# Patient Record
Sex: MALE | Race: WHITE | NOT HISPANIC OR LATINO | Employment: OTHER | URBAN - METROPOLITAN AREA
[De-identification: names, ages, dates, MRNs, and addresses within clinical notes are randomized per-mention and may not be internally consistent; named-entity substitution may affect disease eponyms.]

---

## 2017-01-23 ENCOUNTER — TELEPHONE (OUTPATIENT)
Dept: NEUROLOGY | Facility: CLINIC | Age: 82
End: 2017-01-23

## 2017-01-23 NOTE — TELEPHONE ENCOUNTER
----- Message from Dayana Cox sent at 1/23/2017  9:31 AM CST -----  Contact: June (wife)[phone]  Meghan is calling to schedule her husbands follow up appt, she received a recall letter

## 2017-01-25 ENCOUNTER — TELEPHONE (OUTPATIENT)
Dept: NEUROLOGY | Facility: CLINIC | Age: 82
End: 2017-01-25

## 2017-01-25 NOTE — TELEPHONE ENCOUNTER
----- Message from Dayana Cox sent at 1/25/2017  8:43 AM CST -----  Contact: June [phone]  Meghan is calling to schedule follow up appt for her  to see the doctor in March, lucy call

## 2017-03-01 ENCOUNTER — TELEPHONE (OUTPATIENT)
Dept: NEUROLOGY | Facility: CLINIC | Age: 82
End: 2017-03-01

## 2017-03-01 NOTE — TELEPHONE ENCOUNTER
Left VM for pt's wife, June, on the home line, notifying need of to r/s the March 14th appt with Dr. Gonzalez due to provider being out. Direct contact # provided.

## 2017-03-02 NOTE — TELEPHONE ENCOUNTER
Confirmed new f/u appt for 3/23/17 @ 4:20pm with pt's wife, June. This appt to follow his 3pm appt with Dr. Dia.

## 2017-03-10 DIAGNOSIS — F01.53 VASCULAR DEMENTIA WITH DEPRESSION: ICD-10-CM

## 2017-03-10 DIAGNOSIS — G20.A1 PD (PARKINSON'S DISEASE): ICD-10-CM

## 2017-03-10 DIAGNOSIS — G20.A1 PD (PARKINSON'S DISEASE): Primary | ICD-10-CM

## 2017-03-10 RX ORDER — CARBIDOPA AND LEVODOPA 25; 100 MG/1; MG/1
1.5 TABLET ORAL 4 TIMES DAILY
Qty: 240 TABLET | Refills: 11 | Status: SHIPPED | OUTPATIENT
Start: 2017-03-10 | End: 2017-05-24 | Stop reason: SDUPTHER

## 2017-03-10 RX ORDER — MEMANTINE HYDROCHLORIDE 28 MG/1
1 CAPSULE, EXTENDED RELEASE ORAL DAILY
Qty: 90 EACH | Refills: 0 | Status: SHIPPED | OUTPATIENT
Start: 2017-03-10 | End: 2017-03-23 | Stop reason: SDUPTHER

## 2017-03-10 NOTE — TELEPHONE ENCOUNTER
----- Message from Yandy Nichols sent at 3/10/2017  1:01 PM CST -----  Contact: arcelia (wife) @ [phone]  Pt is req a new prescription for carbidopa-levodopa  mg (SINEMET)  mg per tablet.  [phone].

## 2017-03-23 ENCOUNTER — OFFICE VISIT (OUTPATIENT)
Dept: PSYCHIATRY | Facility: CLINIC | Age: 82
End: 2017-03-23
Payer: MEDICARE

## 2017-03-23 ENCOUNTER — OFFICE VISIT (OUTPATIENT)
Dept: NEUROLOGY | Facility: CLINIC | Age: 82
End: 2017-03-23
Payer: MEDICARE

## 2017-03-23 VITALS — SYSTOLIC BLOOD PRESSURE: 109 MMHG | HEIGHT: 76 IN | DIASTOLIC BLOOD PRESSURE: 56 MMHG | HEART RATE: 61 BPM

## 2017-03-23 VITALS
HEIGHT: 76 IN | SYSTOLIC BLOOD PRESSURE: 112 MMHG | BODY MASS INDEX: 26.55 KG/M2 | HEART RATE: 58 BPM | WEIGHT: 218 LBS | DIASTOLIC BLOOD PRESSURE: 69 MMHG

## 2017-03-23 DIAGNOSIS — G20.A1 PD (PARKINSON'S DISEASE): ICD-10-CM

## 2017-03-23 DIAGNOSIS — H61.22 IMPACTED CERUMEN OF LEFT EAR: Primary | ICD-10-CM

## 2017-03-23 DIAGNOSIS — F01.53 VASCULAR DEMENTIA WITH DEPRESSION: Primary | ICD-10-CM

## 2017-03-23 PROCEDURE — 99213 OFFICE O/P EST LOW 20 MIN: CPT | Mod: PBBFAC | Performed by: PSYCHIATRY & NEUROLOGY

## 2017-03-23 PROCEDURE — 99999 PR PBB SHADOW E&M-EST. PATIENT-LVL III: CPT | Mod: PBBFAC,,, | Performed by: PSYCHIATRY & NEUROLOGY

## 2017-03-23 PROCEDURE — 90847 FAMILY PSYTX W/PT 50 MIN: CPT | Mod: PBBFAC | Performed by: PSYCHIATRY & NEUROLOGY

## 2017-03-23 PROCEDURE — 99213 OFFICE O/P EST LOW 20 MIN: CPT | Mod: PBBFAC,27,25 | Performed by: PSYCHIATRY & NEUROLOGY

## 2017-03-23 PROCEDURE — 90847 FAMILY PSYTX W/PT 50 MIN: CPT | Mod: S$PBB,,, | Performed by: PSYCHIATRY & NEUROLOGY

## 2017-03-23 PROCEDURE — 99215 OFFICE O/P EST HI 40 MIN: CPT | Mod: S$PBB,,, | Performed by: PSYCHIATRY & NEUROLOGY

## 2017-03-23 RX ORDER — ESCITALOPRAM OXALATE 20 MG/1
20 TABLET ORAL DAILY
Qty: 90 TABLET | Refills: 1 | Status: SHIPPED | OUTPATIENT
Start: 2017-03-23 | End: 2017-12-07 | Stop reason: SDUPTHER

## 2017-03-23 RX ORDER — MEMANTINE HYDROCHLORIDE 28 MG/1
1 CAPSULE, EXTENDED RELEASE ORAL DAILY
Qty: 90 EACH | Refills: 1 | Status: SHIPPED | OUTPATIENT
Start: 2017-03-23 | End: 2017-12-07 | Stop reason: SDUPTHER

## 2017-03-23 NOTE — MR AVS SNAPSHOT
[facility]  [address]  [facility] [address]  Phone: [phone]  Fax: [phone]                  Armando Eng JrLatanya   3/23/2017 3:00 PM   Office Visit    Description:  Male : 1931   Provider:  Ritesh Dia Jr., MD   Department:  [facility]           Reason for Visit     Depression     Irritability     Memory Deficits     Psychomotor Retardation           Diagnoses this Visit        Comments    Vascular dementia with depression    -  Primary     PD (Parkinson's disease)                To Do List           Future Appointments        Provider Department Dept Phone    3/23/2017 4:20 PM Elliott Gonzalez MD [facility]rology [phone]      Goals (5 Years of Data)     None      Follow-Up and Disposition     Return in about 3 months (around 2017).       These Medications        Disp Refills Start End    folic acid-vit B6-vit B12 2.5-25-2 mg (FOLBIC) 2.5-25-2 mg Tab 90 tablet 1 3/23/2017     Take 1 tablet by mouth once daily. - Oral    Pharmacy: [facility]/pharmacy #[id number] - [place] [address] Ph #: [phone]       escitalopram oxalate (LEXAPRO) 20 MG tablet 90 tablet 1 3/23/2017     Take 1 tablet (20 mg total) by mouth once daily. - Oral    Pharmacy: [facility]/pharmacy #[id number]  [place] [address] Ph #: [phone]       memantine (NAMENDA XR) 28 mg CSpX 90 each 1 3/23/2017     Take 1 tablet by mouth once daily. - Oral    Pharmacy: [facility]/pharmacy #[id number]  [place] [address] Ph #: [phone]         [facility] On Call     [facility] On Call Nurse Care Line -  Assistance  Registered nurses in the [facility] provide clinical advisement, health education, appointment booking, and other advisory services.  Call for this free service at [phone].             Medications           Message regarding Medications     Verify the changes and/or additions to your medication regime listed below are the same as  discussed with your clinician today.  If any of these changes or additions are incorrect, please notify your healthcare provider.             Verify that the below list of medications is an accurate representation of the medications you are currently taking.  If none reported, the list may be blank. If incorrect, please contact your healthcare provider. Carry this list with you in case of emergency.           Current Medications     acetaminophen (TYLENOL) 325 MG tablet Take 2 tablets (650 mg total) by mouth every 6 (six) hours as needed (Temperature greater than, or equal to 101 degrees).    aluminum-magnesium hydroxide-simethicone (ANTACID ANTI-GAS) 200-200-20 mg/5 mL Susp Take 10 mLs by mouth 4 (four) times daily before meals and nightly.    apixaban (ELIQUIS) 5 mg Tab Take 5 mg by mouth 2 (two) times daily.    arginine-glutamine-calcium HMB (EPIFANIO) 7-7-1.5 gram PwPk Take by mouth once daily.    ascorbic acid (VITAMIN C) 500 MG tablet Take 500 mg by mouth once daily.    ascorbic acid, vitamin C, (VITAMIN C) 500 MG tablet Take 500 mg by mouth once daily.    carbidopa-levodopa  mg (SINEMET)  mg per tablet Take 1.5 tablets by mouth 4 (four) times daily.    carvedilol (COREG) 3.125 MG tablet Take 3.125 mg by mouth 2 (two) times daily.    cholecalciferol, vitamin D3, (VITAMIN D3) 2,000 unit Cap Take 1 capsule by mouth once daily.    docusate sodium (COLACE) 100 MG capsule Take 100 mg by mouth 2 (two) times daily.    escitalopram oxalate (LEXAPRO) 20 MG tablet Take 1 tablet (20 mg total) by mouth once daily.    finasteride (PROSCAR) 5 mg tablet Take 5 mg by mouth once daily.    folic acid-vit B6-vit B12 2.5-25-2 mg (FOLBIC) 2.5-25-2 mg Tab Take 1 tablet by mouth once daily.    food supplement, lactose-free (ENSURE) Liqd Take 1 mL by mouth once daily. 1 can daily    furosemide (LASIX) 20 MG tablet Take 20 mg by mouth 2 (two) times daily.    guaifenesin 100 mg/5 ml (ROBITUSSIN) 100 mg/5 mL syrup Take 200 mg  "by mouth 3 (three) times daily as needed for Cough.    Lactobacillus rhamnosus GG (CULTURELLE) 10 billion cell capsule Take 1 capsule by mouth once daily.    latanoprost 0.005 % ophthalmic solution 1 drop every evening.    lisinopril (PRINIVIL,ZESTRIL) 20 MG tablet Take 1 tablet (20 mg total) by mouth once daily.    memantine (NAMENDA XR) 28 mg CSpX Take 1 tablet by mouth once daily.    nitroGLYCERIN (NITROSTAT) 0.3 MG SL tablet Place 0.3 mg under the tongue every 5 (five) minutes as needed for Chest pain.    rivastigmine (EXELON) 4.6 mg/24 hr PT24 Place 1 patch onto the skin once daily.    spironolactone (ALDACTONE) 25 MG tablet Take 25 mg by mouth once daily.    UBIDECARENONE (COENZYME Q10) 100 mg Tab Take 1 tablet by mouth once daily.           Clinical Reference Information           Your Vitals Were     BP Pulse Height             109/56 61 6' 4" (1.93 m)         Blood Pressure          Most Recent Value    BP  (!)  109/56      Allergies as of 3/23/2017     Indocin [Indomethacin Sodium]      Immunizations Administered on Date of Encounter - 3/23/2017     None      Orders Placed During Today's Visit      Normal Orders This Visit    Psy Authorization: Pharm + Psychotherapy,  6 x/yr       [facility] Sign-Up     Activating your [facility] account is as easy as 1-2-3!     1) Visit [email or web address], select Sign Up Now, enter this activation code and your date of birth, then select Next.  [email or web address]  Expires: 5/7/2017  3:26 PM      2) Create a username and password to use when you visit [facility] in the future and select a security question in case you lose your password and select Next.    3) Enter your e-mail address and click Sign Up!    Additional Information  If you have questions, please e-mail [email or web address] or call [phone] to talk to our [facility] staff. Remember, [facility] is NOT to be used for urgent needs. For medical emergencies, dial 911.         Instructions    Doubt that Exelon Patch is " helping.  Check with Dr. Gonzalez about stopping this.    Also ask about increasing treatment for advancing Parkinson's dz.      Ask Primary Care Physician to examine complaint of ear blockage.    Call if problems arise.  Go to ER if in crisis.         Language Assistance Services     ATTENTION: Language assistance services are available, free of charge. Please call [phone].      ATENCIÓN: Si habla español, tiene a lai disposición servicios gratuitos de asistencia lingüística. Llame al [phone].     CHEMO Ý: N?u b?n nói Ti?ng Vi?t, có các d?ch v? h? tr? ngôn ng? mi?n phí dành cho b?n. G?i s? [phone].         [facility] complies with applicable Federal civil rights laws and does not discriminate on the basis of race, color, national origin, age, disability, or sex.

## 2017-03-23 NOTE — LETTER
March 28, 2017      Mello Jameson MD  [address] [facility] [place] [address]           [facility]  [address]  [place] [address]  Phone: [phone]  Fax: [phone]          Patient: Armando Eng Jr.   MR Number: [id number]   YOB: 1931   Date of Visit: 3/23/2017       Dear Dr. Mello Jameson:    Thank you for referring Armando Eng to me for evaluation. Attached you will find relevant portions of my assessment and plan of care.    If you have questions, please do not hesitate to call me. I look forward to following Armando Eng along with you.    Sincerely,    Elliott Gonzalez MD    Enclosure  CC:  No Recipients    If you would like to receive this communication electronically, please contact [email or web address] or [phone] to request more information on [employer] Link access.    For providers and/or their staff who would like to refer a patient to [facility], please contact us through our one-stop-shop provider referral line, [facility], at [phone].    If you feel you have received this communication in error or would no longer like to receive these types of communications, please e-mail [email or web address]

## 2017-03-23 NOTE — PATIENT INSTRUCTIONS
Doubt that Exelon Patch is helping.  Check with Dr. Gonzalez about stopping this.    Also ask about increasing treatment for advancing Parkinson's dz.      Ask Primary Care Physician to examine complaint of ear blockage.    Call if problems arise.  Go to ER if in crisis.

## 2017-03-23 NOTE — PROGRESS NOTES
Name: Armando Eng Jr.  MRN: [id number]   CSN: [id number]      Date: 03/23/2017    Interval History:  Still living at [facility]  - Accompanied by his son Albert today  - Overall, patient states he has been feeling pretty good  - Patient has seen psychiatrist who recommended stopping exelon patch because it has been having limited if any effect   - Pt sleeping a lot during the day   - Having confusion  - More difficult to get patient transferred in and out of car  - Wheelchair bound most of the time.   - Uses wrong utensils when eating         Current regimen:   Sinemet 25/100mg 1 tabs five times daily, rivastigmine 4.6mg Q 24 hours         History of Present Illness (HPI):   Mr. Eng is an 82 yo  man, retired [employer], with dx of PD, for which he first started seeing a neurology 12 years ago (Dr. Davis at [facility], then since he retired, Dr. Ken for the past 3 years). He has been on PD for a long time (~ 2003), and Dr. Ken had last increased Sinemet to 2.5 tablets QID. He recently had a bout of worsening confusion with subacute onset, associated with combative and violent behavior. Pt's wife decreased it to 2 tablets QID about 1 week ago and pt was still confused. Delirium worsened over a period of a week in March which prompted a visit to the ED. Following this he was admitted to [facility] from 03/26/13 through 04/04/13. Multiple psychotropic medications were discontinued, included Azilect, and Sinemet was resumed. According to review of records it appears Sinemet  mg was most recently reduced from 3 tabs PO TID to 1.5 tabs PO TID. Cognition and behavior have improved since time of hospital discharge but patient was not deemed safe to go home, and has been living in SNF at [facility], where he currently resides. Overall he is clearly eager to return home, and per pt's wife, PT/OT estimates he needs about another month of inpatient therapy before being deemed safe to go home. His functional status  "continues to improve slowly with therapy, and he appears to have returned to his cognitive baseline.   He has no specific complains today other than being eager to leave the nursing facility and go back home.    In September, Had recent admission to  for CHF - took off "liters" of fluid amounting to 20 lbs.  Feels better now.  Doing well with [facility] but stopped due to problem above. No hallucinations or delusions at this time.  Wife very pleased.  Thinking very clearly.  Feels he is following a good healthy regimen at the [facility] (home on the weekends).  has had some dysautonomia     ROS:  Review of Systems   Constitutional: Positive for malaise/fatigue.   Musculoskeletal: Positive for falls (RISK).   Neurological: Negative for tremors.   Psychiatric/Behavioral: Positive for depression and memory loss. Negative for hallucinations (son states he periodically sees him manipulating his hands as if there is something there ).       Past Medical History: The patient  has a past medical history of A-fib (03-27-13); Atrial fibrillation; Dementia; Pacemaker; and PD (Parkinson's disease) (2002).    Social History: The patient  reports that he has never smoked. He has never used smokeless tobacco. He reports that he does not drink alcohol or use illicit drugs.    Family History: Their family history is not on file.    Allergies: Indocin [indomethacin sodium]     Meds:   Current Outpatient Prescriptions on File Prior to Visit   Medication Sig Dispense Refill    apixaban (ELIQUIS) 5 mg Tab Take 5 mg by mouth 2 (two) times daily.      ascorbic acid, vitamin C, (VITAMIN C) 500 MG tablet Take 500 mg by mouth once daily.      carbidopa-levodopa  mg (SINEMET)  mg per tablet Take 1.5 tablets by mouth 4 (four) times daily. 240 tablet 11    cholecalciferol, vitamin D3, (VITAMIN D3) 2,000 unit Cap Take 1 capsule by mouth once daily.      docusate sodium (COLACE) 100 MG capsule Take 100 mg by mouth 2 (two) " times daily.      escitalopram oxalate (LEXAPRO) 20 MG tablet Take 1 tablet (20 mg total) by mouth once daily. 90 tablet 1    finasteride (PROSCAR) 5 mg tablet Take 5 mg by mouth once daily.      food supplement, lactose-free (ENSURE) Liqd Take 1 mL by mouth once daily. 1 can daily      furosemide (LASIX) 20 MG tablet Take 20 mg by mouth 2 (two) times daily.      guaifenesin 100 mg/5 ml (ROBITUSSIN) 100 mg/5 mL syrup Take 200 mg by mouth 3 (three) times daily as needed for Cough.      Lactobacillus rhamnosus GG (CULTURELLE) 10 billion cell capsule Take 1 capsule by mouth once daily.      latanoprost 0.005 % ophthalmic solution 1 drop every evening.      memantine (NAMENDA XR) 28 mg CSpX Take 1 tablet by mouth once daily. 90 each 1    nitroGLYCERIN (NITROSTAT) 0.3 MG SL tablet Place 0.3 mg under the tongue every 5 (five) minutes as needed for Chest pain.      rivastigmine (EXELON) 4.6 mg/24 hr PT24 Place 1 patch onto the skin once daily. 30 patch 11    spironolactone (ALDACTONE) 25 MG tablet Take 25 mg by mouth once daily.      UBIDECARENONE (COENZYME Q10) 100 mg Tab Take 1 tablet by mouth once daily.      acetaminophen (TYLENOL) 325 MG tablet Take 2 tablets (650 mg total) by mouth every 6 (six) hours as needed (Temperature greater than, or equal to 101 degrees).      aluminum-magnesium hydroxide-simethicone (ANTACID ANTI-GAS) 200-200-20 mg/5 mL Susp Take 10 mLs by mouth 4 (four) times daily before meals and nightly.      arginine-glutamine-calcium HMB (EPIFANIO) 7-7-1.5 gram PwPk Take by mouth once daily.      ascorbic acid (VITAMIN C) 500 MG tablet Take 500 mg by mouth once daily.      carvedilol (COREG) 3.125 MG tablet Take 3.125 mg by mouth 2 (two) times daily.      folic acid-vit B6-vit B12 2.5-25-2 mg (FOLBIC) 2.5-25-2 mg Tab Take 1 tablet by mouth once daily. 90 tablet 1    lisinopril (PRINIVIL,ZESTRIL) 20 MG tablet Take 1 tablet (20 mg total) by mouth once daily. 30 tablet 0    [DISCONTINUED]  "escitalopram oxalate (LEXAPRO) 20 MG tablet Take 1 tablet (20 mg total) by mouth once daily. 90 tablet 1    [DISCONTINUED] folic acid-vit B6-vit B12 2.5-25-2 mg (FOLBIC) 2.5-25-2 mg Tab Take 1 tablet by mouth once daily.      [DISCONTINUED] memantine (NAMENDA XR) 28 mg CSpX Take 1 tablet by mouth once daily. 90 each 0     No current facility-administered medications on file prior to visit.        Exam:  /69  Pulse (!) 58  Ht 6' 4" (1.93 m)  Wt 98.9 kg (218 lb)  BMI 26.54 kg/m2    Otoscopic: Impacted cerumen L ear, right tympanic membrane clear     * Specialized movement exam  No temporal tenderness, normal temporal pulses     Eyes open, drowsy.  Slow to communicate.   Hypophonic speech.     Mild facial masking with infrequent blinking.  L>R leonard and CW rigidity.    No RLE tremor with rest, posture, kinesis, or intention.   Has intermittent myoclonus.   + grasp     Gait not evaluated     Non-contrast brain CT - 3/27/2013   Result Narrative       Comparison is made to the cranial CT exam of 3/11/09.    Ventricular system is unchanged in size from the prior exam, and the ventricles are normal in position without midline shift. Well defined, prominent sulci are again noted over both convexities. Parenchymal brain attenuation demonstrates no detrimental  change since the prior exam, with note again made of hemispheric white matter hypoattenuation bilaterally consistent with chronic ischemic microvascular disease. No evidence of acute intracranial hemorrhage. No findings on the noncontrast exam to   suggest neoplasm. No subdural collections. No new areas of parenchymal hypoattenuation or other abnormality indicating recent major vascular distribution infarction. Mucosal thickening in the posterior ethmoid air cells on the right side is   incidentally observed, and incidental note is again made of a small soft tissue opacity within the anterior aspect of the nasal fossa on the right side, an abnormality which " appears essentially stable since the  exam and may represent a nasal polyp.           Result Impression       Noncontrast cranial CT examination again demonstrates findings consistent with chronic ischemic microvascular changes in the hemispheric white matter bilaterally. However, there has been no significant detrimental interval change since 3/11/09, with no  evidence of acute hemorrhage or recent major vascular distribution infarction noted.       - Independent review of images:   No acute intracranial process with diffuse atrophy consistent with age and moderate to severe white matter disease bilaterally suggestive of chronic microvascular ischemia.     Diagnoses:   1) Parkinsonism with dementia, likely mixed vascular and Lewy body progression at this point.  2) Excessive daytime sleepiness.  3) Dysautonomia.    Medical Decision Makin) CD/LD 25/100mg 1 tablet five times a day  2) Continue excelon patch for now   3) Home therapies at [facility] as able  4) ENT referral for L ear cerumen disimpaction     RTC 4-6 weeks will discontinue excelon patch at that time if motor symptoms improved    Complicated comorbidities.     Pt seen and examined with Dr. Carlos Heaton MD  Movement Disorders Fellow  [facility]     ===============  Patient seen and examined.  I agree with the history, exam, assessment and plan within the fellow's note as stated above.  Note has been edited by me to reflect my work and changes.    Elliott Gonzalez MD, MPH  Division of Movement and Memory Disorders  [facility]

## 2017-03-31 NOTE — PROGRESS NOTES
Outpatient Psychiatry Follow-Up Visit (MD/NP)    3/23/2017    Clinical Status of Patient:  Outpatient (Ambulatory)    Chief Complaint:  Armando Eng Jr. is a 85 y.o. male who presents today for follow-up of Depression; Irritability; Memory Deficits; and Psychomotor Retardation    Met with patient and spouse.      Interval History and Content of Current Session:  Interim Events/Subjective Report/Content of Current Session:  Pt returned with his son.  He shows significant cognitive and behavioral decline since his last visit.  This was verified by his son, who indicated that Pt is less active, less interactive, and now rarely returns to his home.  On the other hand, no further falls or behavioral problems were reported.  Pt himself denies any dissatisfaction with his present arrangements.      Medications were reviewed.  An Exelon patch prescribed by Dr. Gonzalez does not appear to be providing any additional benefit.  Pt's son was asked to discuss stopping with when they see Dr. Gonzalez.  At the same time, they were encouraged to ask about possibly increasing medications for Parkinson's disease.  Finally, Pt c/o feeling that his ears are stopped up.  They were instructed to ask his nursing home physician about this on monthly rounds.    Psychotherapy:  · Target symptoms: Memory loss, confusion, agitation.  · Why chosen therapy is appropriate versus another modality: relevant to diagnosis  · Outcome monitoring methods: self-report, observation, feedback from family  · Therapeutic intervention type: behavior modifying psychotherapy, supportive psychotherapy, Family therapy  · Topics discussed/themes: Caregiver issues, relationships difficulties, illness/death of a loved one, stress related to medical comorbidities, symptom recognition, legal stressors, substance abuse  · The patient's response to the intervention is accepting. The patient's progress toward treatment goals is fair.   · Duration of intervention: 30  min    Review of Systems   · PSYCHIATRIC: Pertinant items are noted in the narrative.  · CONSTITUTIONAL: Slow weight loss   · MUSCULOSKELETAL: Generalized weakness.  · NEUROLOGIC: Parkinson disease.  · CARDIOVASCULAR: HTN, Pacemaker.    Past Medical, Family and Social History: The patient's past medical, family and social history have been reviewed and updated as appropriate within the electronic medical record - see encounter notes.    Compliance: yes    Side effects: None    Risk Parameters:  Patient reports no suicidal ideation  Patient reports no homicidal ideation  Patient reports no self-injurious behavior  Patient reports no violent behavior    Exam (detailed: at least 9 elements; comprehensive: all 15 elements)   Constitutional  Vitals:  Most recent vital signs, dated less than 90 days prior to this appointment, were reviewed.    Vitals:    03/23/17 1447   BP: (!) 109/56   Pulse: 61        General:  age appropriate, casually dressed, seated in wheelchair     Musculoskeletal  Muscle Strength/Tone:  no rigidity, no dyskinesia, no tremor, atrophy noted generalized weakness.   Gait & Station:  in wheelchair     Psychiatric  Speech:  soft, non-spontaneous   Mood & Affect:  neutral  shallow   Thought Process:  concrete, poverty of thought   Associations:  intact   Thought Content:  normal, no suicidality, no homicidality, delusions, or paranoia   Insight:  poor awareness of illness   Judgement: impaired due to dementia   Orientation:  person, situation, month of year   Memory: poor x 3.   Language: grossly intact   Attention Span & Concentration:  distracted   Fund of Knowledge:  impaired     Assessment and Diagnosis   Status/Progress: Based on the examination today, the patient's problem(s) is/are worsening.  New problems have not been presented today.   Comorbidities are complicating management of the primary condition.  There are no active rule-out diagnoses for this patient at this time.    General Impression:   Pt shows gradual cognitive and behavioral decline between visits.    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  Axis II: NO DIAGNOSIS ON AXIS II (V71.09)  Axis III: Parkinson dz.  Atrial Fibrillation.  Rash.  Axis IV: other psychosocial or environmental problems  Axis V: 61-70 Some mild symptoms (e.g. depressed mood and mild insomnia) OR some difficulty in social, occupational, or school functioning (e.g. occasional truancy, or theft within the household), but generally functioning pretty well, has some meaningful interpersonal relationships    Intervention/Counseling/Treatment Plan   · Medication Management: Continue current medications.  · Pt should remain at [facility].      Return to Clinic: 3 months.  Call prn problems.

## 2017-04-24 ENCOUNTER — TELEPHONE (OUTPATIENT)
Dept: NEUROLOGY | Facility: CLINIC | Age: 82
End: 2017-04-24

## 2017-04-24 NOTE — TELEPHONE ENCOUNTER
----- Message from Eber Cano sent at 4/24/2017  8:07 AM CDT -----  Contact: Spouse  Would like to know why patient is scheduled with another physician on 5/24/17? She states he has been seeing Dr. Gonzalez, and would like to know why the change.    Call: [phone]

## 2017-04-25 NOTE — TELEPHONE ENCOUNTER
----- Message from Yandy Nichols sent at 4/25/2017  8:48 AM CDT -----  Contact: arcelia (wife) @ [phone]  pts wife says she is returning your call.

## 2017-04-25 NOTE — TELEPHONE ENCOUNTER
"Returned wife, Meghan's call. I informed Meghan that patient was seen by Dr. Heaton at the last visit along with Dr. Gonzlaez and the follow up visit scheduled with [facility] should've been discussed during said visit. Further explained that [facility] works together with Dr. Gonzalez, Dr. Ken and Gladys Avila as a team; explained that if [facility] has patients Dr. Gonzalez or Dr. Ken always staff with her as well.  Reassured patient that Dr. Gonzalez isn't "putting patient off' onto another provider. Meghan expressed she was not at the last [facility] visit and going forward would like to only be seen by [facility]. I  Informed patient that appointment with Dr. Heaton will remain, and to address any concerns at their next visit.  I apologized for any miscommunication and informed patient if any other questions or concerns arise to please call our staff so we may help in best possible way.  Meghan expressed verbal understanding.  "

## 2017-05-24 ENCOUNTER — OFFICE VISIT (OUTPATIENT)
Dept: NEUROLOGY | Facility: CLINIC | Age: 82
End: 2017-05-24
Payer: MEDICARE

## 2017-05-24 DIAGNOSIS — G31.83 LEWY BODY DEMENTIA WITHOUT BEHAVIORAL DISTURBANCE: ICD-10-CM

## 2017-05-24 DIAGNOSIS — G20.A1 PD (PARKINSON'S DISEASE): ICD-10-CM

## 2017-05-24 DIAGNOSIS — F02.80 LEWY BODY DEMENTIA WITHOUT BEHAVIORAL DISTURBANCE: ICD-10-CM

## 2017-05-24 DIAGNOSIS — R05.9 COUGH: ICD-10-CM

## 2017-05-24 PROCEDURE — 99212 OFFICE O/P EST SF 10 MIN: CPT | Mod: PBBFAC | Performed by: PSYCHIATRY & NEUROLOGY

## 2017-05-24 PROCEDURE — 99212 OFFICE O/P EST SF 10 MIN: CPT | Mod: S$PBB,,, | Performed by: PSYCHIATRY & NEUROLOGY

## 2017-05-24 PROCEDURE — 99999 PR PBB SHADOW E&M-EST. PATIENT-LVL II: CPT | Mod: PBBFAC,,, | Performed by: PSYCHIATRY & NEUROLOGY

## 2017-05-24 RX ORDER — CARBIDOPA AND LEVODOPA 25; 100 MG/1; MG/1
TABLET ORAL
Qty: 300 TABLET | Refills: 11 | Status: SHIPPED | OUTPATIENT
Start: 2017-05-24 | End: 2017-05-24 | Stop reason: SDUPTHER

## 2017-05-24 RX ORDER — CARBIDOPA AND LEVODOPA 25; 100 MG/1; MG/1
TABLET ORAL
Qty: 300 TABLET | Refills: 11 | Status: SHIPPED | OUTPATIENT
Start: 2017-05-24

## 2017-05-24 RX ORDER — CARBIDOPA AND LEVODOPA 25; 100 MG/1; MG/1
TABLET ORAL
Qty: 300 TABLET | Refills: 11 | OUTPATIENT
Start: 2017-05-24 | End: 2017-05-24 | Stop reason: SDUPTHER

## 2017-05-24 NOTE — ASSESSMENT & PLAN NOTE
Lung sounds clear on exam today     - Consider repeat CXR at nursing facility if cough does not resolve within one week

## 2017-05-24 NOTE — PROGRESS NOTES
Name: Armando Eng Jr.  MRN: [id number]   CSN: [id number]      Date: 05/24/2017    Interval History:  Still living at [facility]  - Family still doesn't think that excelon patch is helping with anything  - CD/LD increase hasn't changed his ability to ambulate   - Still very confused  - Eating is dong well, had one chocking incident   - Still sleeping quite a bit but level of consiousness not fluctuating as much   - still with ear wax   - coughing more   - manipulates things with his hands that are not there still     Interval History 3/23/17  - Accompanied by his son Albert today  - Overall, patient states he has been feeling pretty good  - Patient has seen psychiatrist who recommended stopping exelon patch because it has been having limited if any effect   - Pt sleeping a lot during the day   - Having confusion  - More difficult to get patient transferred in and out of car  - Wheelchair bound most of the time.   - Uses wrong utensils when eating     Current regimen:   Sinemet 25/100mg 1 tabs five times daily, rivastigmine 4.6mg Q 24 hours         History of Present Illness (HPI):   Mr. Eng is an 80 yo  man, retired [employer], with dx of PD, for which he first started seeing a neurology 12 years ago (Dr. Davis at [facility], then since he retired, Dr. Ken for the past 3 years). He has been on PD for a long time (~ 2003), and Dr. Ken had last increased Sinemet to 2.5 tablets QID. He recently had a bout of worsening confusion with subacute onset, associated with combative and violent behavior. Pt's wife decreased it to 2 tablets QID about 1 week ago and pt was still confused. Delirium worsened over a period of a week in March which prompted a visit to the ED. Following this he was admitted to [facility] from 03/26/13 through 04/04/13. Multiple psychotropic medications were discontinued, included Azilect, and Sinemet was resumed. According to review of records it appears Sinemet  mg was most recently reduced from  "3 tabs PO TID to 1.5 tabs PO TID. Cognition and behavior have improved since time of hospital discharge but patient was not deemed safe to go home, and has been living in SNF at [facility], where he currently resides. Overall he is clearly eager to return home, and per pt's wife, PT/OT estimates he needs about another month of inpatient therapy before being deemed safe to go home. His functional status continues to improve slowly with therapy, and he appears to have returned to his cognitive baseline.   He has no specific complains today other than being eager to leave the nursing facility and go back home.    In September, Had recent admission to  for CHF - took off "liters" of fluid amounting to 20 lbs.  Feels better now.  Doing well with [facility] but stopped due to problem above. No hallucinations or delusions at this time.  Wife very pleased.  Thinking very clearly.  Feels he is following a good healthy regimen at the [facility] (home on the weekends).  has had some dysautonomia     ROS:  Review of Systems   Constitutional: Positive for malaise/fatigue.   Musculoskeletal: Positive for falls (RISK).   Neurological: Negative for tremors.   Psychiatric/Behavioral: Positive for depression and memory loss. Negative for hallucinations (son states he periodically sees him manipulating his hands as if there is something there ).       Past Medical History: The patient  has a past medical history of A-fib (03-27-13); Atrial fibrillation; Dementia; Pacemaker; and PD (Parkinson's disease) (2002).    Social History: The patient  reports that he has never smoked. He has never used smokeless tobacco. He reports that he does not drink alcohol or use drugs.    Family History: Their family history is not on file.    Allergies: Indocin [indomethacin sodium]     Meds:   Current Outpatient Prescriptions on File Prior to Visit   Medication Sig Dispense Refill    apixaban (ELIQUIS) 5 mg Tab Take 5 mg by mouth 2 (two) times " daily.      ascorbic acid (VITAMIN C) 500 MG tablet Take 500 mg by mouth once daily.      carbidopa-levodopa  mg (SINEMET)  mg per tablet Take 1.5 tablets by mouth 4 (four) times daily. 240 tablet 11    carvedilol (COREG) 3.125 MG tablet Take 3.125 mg by mouth 2 (two) times daily.      cholecalciferol, vitamin D3, (VITAMIN D3) 2,000 unit Cap Take 1 capsule by mouth once daily.      docusate sodium (COLACE) 100 MG capsule Take 100 mg by mouth 2 (two) times daily.      escitalopram oxalate (LEXAPRO) 20 MG tablet Take 1 tablet (20 mg total) by mouth once daily. 90 tablet 1    finasteride (PROSCAR) 5 mg tablet Take 5 mg by mouth once daily.      folic acid-vit B6-vit B12 2.5-25-2 mg (FOLBIC) 2.5-25-2 mg Tab Take 1 tablet by mouth once daily. 90 tablet 1    furosemide (LASIX) 20 MG tablet Take 20 mg by mouth 2 (two) times daily.      guaifenesin 100 mg/5 ml (ROBITUSSIN) 100 mg/5 mL syrup Take 200 mg by mouth 3 (three) times daily as needed for Cough.      Lactobacillus rhamnosus GG (CULTURELLE) 10 billion cell capsule Take 1 capsule by mouth once daily.      latanoprost 0.005 % ophthalmic solution 1 drop every evening.      memantine (NAMENDA XR) 28 mg CSpX Take 1 tablet by mouth once daily. 90 each 1    rivastigmine (EXELON) 4.6 mg/24 hr PT24 Place 1 patch onto the skin once daily. 30 patch 11    spironolactone (ALDACTONE) 25 MG tablet Take 25 mg by mouth once daily.      UBIDECARENONE (COENZYME Q10) 100 mg Tab Take 1 tablet by mouth once daily.      acetaminophen (TYLENOL) 325 MG tablet Take 2 tablets (650 mg total) by mouth every 6 (six) hours as needed (Temperature greater than, or equal to 101 degrees).      aluminum-magnesium hydroxide-simethicone (ANTACID ANTI-GAS) 200-200-20 mg/5 mL Susp Take 10 mLs by mouth 4 (four) times daily before meals and nightly.      arginine-glutamine-calcium HMB (EPIFANIO) 7-7-1.5 gram PwPk Take by mouth once daily.      ascorbic acid, vitamin C, (VITAMIN  C) 500 MG tablet Take 500 mg by mouth once daily.      food supplement, lactose-free (ENSURE) Liqd Take 1 mL by mouth once daily. 1 can daily      lisinopril (PRINIVIL,ZESTRIL) 20 MG tablet Take 1 tablet (20 mg total) by mouth once daily. 30 tablet 0    nitroGLYCERIN (NITROSTAT) 0.3 MG SL tablet Place 0.3 mg under the tongue every 5 (five) minutes as needed for Chest pain.       No current facility-administered medications on file prior to visit.        Exam:  There were no vitals taken for this visit.    Otoscopic: Impacted cerumen L ear, right tympanic membrane clear     * Specialized movement exam    Eyes open, drowsy.  Oriented to person, not month or president, Slow to communicate.   Spells world correctly forwards but backwards: LD, later DLR  Hypophonic speech.     Mild facial masking with infrequent blinking.  L>R leonard and CW rigidity.    No RLE tremor with rest, posture, kinesis, or intention.   Has intermittent myoclonus.   + grasp     Gait not evaluated     Non-contrast brain CT - 3/27/2013   Result Narrative       Comparison is made to the cranial CT exam of 3/11/09.    Ventricular system is unchanged in size from the prior exam, and the ventricles are normal in position without midline shift. Well defined, prominent sulci are again noted over both convexities. Parenchymal brain attenuation demonstrates no detrimental  change since the prior exam, with note again made of hemispheric white matter hypoattenuation bilaterally consistent with chronic ischemic microvascular disease. No evidence of acute intracranial hemorrhage. No findings on the noncontrast exam to   suggest neoplasm. No subdural collections. No new areas of parenchymal hypoattenuation or other abnormality indicating recent major vascular distribution infarction. Mucosal thickening in the posterior ethmoid air cells on the right side is   incidentally observed, and incidental note is again made of a small soft tissue opacity within the  anterior aspect of the nasal fossa on the right side, an abnormality which appears essentially stable since the 2009 exam and may represent a nasal polyp.           Result Impression       Noncontrast cranial CT examination again demonstrates findings consistent with chronic ischemic microvascular changes in the hemispheric white matter bilaterally. However, there has been no significant detrimental interval change since 3/11/09, with no  evidence of acute hemorrhage or recent major vascular distribution infarction noted.       - Independent review of images:   No acute intracranial process with diffuse atrophy consistent with age and moderate to severe white matter disease bilaterally suggestive of chronic microvascular ischemia.       Medical Decision Making:     Problem List        Neuro    Lewy body dementia without behavioral disturbance    Overview     Severe parkinsonism with bradykinesia, hypophonic speech, decreased ambulation          Current Assessment & Plan     - Continue Carbidopa/Levodopa 1 tablets five times daily  - Discontinue Excelon patch but resume in a few days if any symptoms worse   - RTC 3 months with Dr. Gonzalez          Relevant Medications    carbidopa-levodopa  mg (SINEMET)  mg per tablet       Pulmonary    Cough    Overview     Productive         Current Assessment & Plan     Lung sounds clear on exam today     - Consider repeat CXR at nursing facility if cough does not resolve within one week                  Hallucinations are mild and not interfering with life right now, patient and family opting to monitor for now. No new medications.     Min Heaton MD  Associate Staff, Movement Disorders Fellow  [facility]

## 2017-05-24 NOTE — ASSESSMENT & PLAN NOTE
- Continue Carbidopa/Levodopa 1 tablets five times daily  - Discontinue Excelon patch but resume in a few days if any symptoms worse   - RTC 3 months with Dr. Gonzalez

## 2017-08-04 ENCOUNTER — TELEPHONE (OUTPATIENT)
Dept: NEUROLOGY | Facility: CLINIC | Age: 82
End: 2017-08-04

## 2017-08-04 NOTE — TELEPHONE ENCOUNTER
----- Message from Yandy Nichols sent at 8/4/2017  8:27 AM CDT -----  Contact: arcelia (wife) @ [phone]  pts wife says they missed a call yesterday from [facility].  She is wondering if someone from Dr Gonzalez's office tried to call them.  If no answer pls leave a message.

## 2017-08-04 NOTE — TELEPHONE ENCOUNTER
SPoke with pt's wife, June, and the pt has been r/s for 9/6/17 @ 4:00pm with Dr. Gonzalez. She will contact [facility] and arrange for van transport for him to come to [facility].

## 2017-09-06 ENCOUNTER — OFFICE VISIT (OUTPATIENT)
Dept: NEUROLOGY | Facility: CLINIC | Age: 82
End: 2017-09-06
Payer: MEDICARE

## 2017-09-06 VITALS
DIASTOLIC BLOOD PRESSURE: 77 MMHG | HEART RATE: 60 BPM | WEIGHT: 210 LBS | SYSTOLIC BLOOD PRESSURE: 122 MMHG | BODY MASS INDEX: 25.57 KG/M2 | HEIGHT: 76 IN

## 2017-09-06 DIAGNOSIS — G31.83 LEWY BODY DEMENTIA WITH BEHAVIORAL DISTURBANCE: Primary | ICD-10-CM

## 2017-09-06 DIAGNOSIS — F02.818 LEWY BODY DEMENTIA WITH BEHAVIORAL DISTURBANCE: Primary | ICD-10-CM

## 2017-09-06 PROCEDURE — 99999 PR PBB SHADOW E&M-EST. PATIENT-LVL III: CPT | Mod: PBBFAC,,, | Performed by: PSYCHIATRY & NEUROLOGY

## 2017-09-06 PROCEDURE — 99213 OFFICE O/P EST LOW 20 MIN: CPT | Mod: S$PBB,,, | Performed by: PSYCHIATRY & NEUROLOGY

## 2017-09-06 PROCEDURE — 1159F MED LIST DOCD IN RCRD: CPT | Mod: ,,, | Performed by: PSYCHIATRY & NEUROLOGY

## 2017-09-06 PROCEDURE — 99213 OFFICE O/P EST LOW 20 MIN: CPT | Mod: PBBFAC | Performed by: PSYCHIATRY & NEUROLOGY

## 2017-09-06 PROCEDURE — 3074F SYST BP LT 130 MM HG: CPT | Mod: ,,, | Performed by: PSYCHIATRY & NEUROLOGY

## 2017-09-06 PROCEDURE — 3078F DIAST BP <80 MM HG: CPT | Mod: ,,, | Performed by: PSYCHIATRY & NEUROLOGY

## 2017-09-06 PROCEDURE — 1126F AMNT PAIN NOTED NONE PRSNT: CPT | Mod: ,,, | Performed by: PSYCHIATRY & NEUROLOGY

## 2017-09-06 NOTE — PROGRESS NOTES
Name: Armando Eng Jr.  MRN: [id number]   CSN: [id number]      Date: 09/06/2017    Interval History:  Still living at [facility],   - here with wife  - more issues walking  - fall risk  - seems slower to respond  - less interactive in conversation    From last visit:  - Accompanied by his son Albert today  - Overall, patient states he has been feeling pretty good  - Patient has seen psychiatrist who recommended stopping exelon patch because it has been having limited if any effect   - Pt sleeping a lot during the day   - Having confusion  - More difficult to get patient transferred in and out of car  - Wheelchair bound most of the time.   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 Davis at [facility]", then since he retired, Dr. Ken for the past 3 years). He has been on PD for a long time (~ 2003), and Dr. Ken had last increased Sinemet to 2.5 tablets QID. He recently had a bout of worsening confusion with subacute onset, associated with combative and violent behavior. Pt's wife decreased it to 2 tablets QID about 1 week ago and pt was still confused. Delirium worsened over a period of a week in March which prompted a visit to the ED. Following this he was admitted to [facility] from 03/26/13 through 04/04/13. Multiple psychotropic medications were discontinued, included Azilect, and Sinemet was resumed. According to review of records it appears Sinemet  mg was most recently reduced from 3 tabs PO TID to 1.5 tabs PO TID. Cognition and behavior have improved since time of hospital discharge but patient was not deemed safe to go home, and has been living in SNF at [facility], where he currently resides. Overall he is clearly eager to return home, and per pt's  "wife, PT/OT estimates he needs about another month of inpatient therapy before being deemed safe to go home. His functional status continues to improve slowly with therapy, and he appears to have returned to his cognitive baseline.   He has no specific complains today other than being eager to leave the nursing facility and go back home.    In September, Had recent admission to  for CHF - took off "liters" of fluid amounting to 20 lbs.  Feels better now.  Doing well with [facility] but stopped due to problem above. No hallucinations or delusions at this time.  Wife very pleased.  Thinking very clearly.  Feels he is following a good healthy regimen at the [facility] (home on the weekends).  has had some dysautonomia     ROS:  Review of Systems   Constitutional: Positive for malaise/fatigue.   Musculoskeletal: Positive for falls (RISK).   Neurological: Negative for tremors.   Psychiatric/Behavioral: Positive for depression and memory loss. Negative for hallucinations (son states he periodically sees him manipulating his hands as if there is something there ).       Past Medical History: The patient  has a past medical history of A-fib (03-27-13); Atrial fibrillation; Dementia; Pacemaker; and PD (Parkinson's disease) (2002).    Social History: The patient  reports that he has never smoked. He has never used smokeless tobacco. He reports that he does not drink alcohol or use drugs.    Family History: Their family history is not on file.    Allergies: Indocin [indomethacin sodium]     Meds:   Current Outpatient Prescriptions on File Prior to Visit   Medication Sig Dispense Refill    apixaban (ELIQUIS) 5 mg Tab Take 5 mg by mouth 2 (two) times daily.      cholecalciferol, vitamin D3, (VITAMIN D3) 2,000 unit Cap Take 1 capsule by mouth once daily.      docusate sodium (COLACE) 100 MG capsule Take 100 mg by mouth 2 (two) times daily.      escitalopram oxalate (LEXAPRO) 20 MG tablet Take 1 tablet (20 mg total) by " mouth once daily. 90 tablet 1    finasteride (PROSCAR) 5 mg tablet Take 5 mg by mouth once daily.      folic acid-vit B6-vit B12 2.5-25-2 mg (FOLBIC) 2.5-25-2 mg Tab Take 1 tablet by mouth once daily. 90 tablet 1    furosemide (LASIX) 20 MG tablet Take 20 mg by mouth 2 (two) times daily.      latanoprost 0.005 % ophthalmic solution 1 drop every evening.      spironolactone (ALDACTONE) 25 MG tablet Take 25 mg by mouth once daily.      UBIDECARENONE (COENZYME Q10) 100 mg Tab Take 1 tablet by mouth once daily.      acetaminophen (TYLENOL) 325 MG tablet Take 2 tablets (650 mg total) by mouth every 6 (six) hours as needed (Temperature greater than, or equal to 101 degrees).      aluminum-magnesium hydroxide-simethicone (ANTACID ANTI-GAS) 200-200-20 mg/5 mL Susp Take 10 mLs by mouth 4 (four) times daily before meals and nightly.      arginine-glutamine-calcium HMB (EPIFANIO) 7-7-1.5 gram PwPk Take by mouth once daily.      ascorbic acid (VITAMIN C) 500 MG tablet Take 500 mg by mouth once daily.      ascorbic acid, vitamin C, (VITAMIN C) 500 MG tablet Take 500 mg by mouth once daily.      carbidopa-levodopa  mg (SINEMET)  mg per tablet Take 1 tablet five times daily 300 tablet 11    carvedilol (COREG) 3.125 MG tablet Take 3.125 mg by mouth 2 (two) times daily.      food supplement, lactose-free (ENSURE) Liqd Take 1 mL by mouth once daily. 1 can daily      guaifenesin 100 mg/5 ml (ROBITUSSIN) 100 mg/5 mL syrup Take 200 mg by mouth 3 (three) times daily as needed for Cough.      Lactobacillus rhamnosus GG (CULTURELLE) 10 billion cell capsule Take 1 capsule by mouth once daily.      lisinopril (PRINIVIL,ZESTRIL) 20 MG tablet Take 1 tablet (20 mg total) by mouth once daily. 30 tablet 0    memantine (NAMENDA XR) 28 mg CSpX Take 1 tablet by mouth once daily. 90 each 1    nitroGLYCERIN (NITROSTAT) 0.3 MG SL tablet Place 0.3 mg under the tongue every 5 (five) minutes as needed for Chest pain.       No  "current facility-administered medications on file prior to visit.        Exam:  /77   Pulse 60   Ht 6' 4" (1.93 m)   Wt 95.3 kg (210 lb)   BMI 25.56 kg/m²     Otoscopic: Impacted cerumen L ear, right tympanic membrane clear     * Specialized movement exam  No temporal tenderness, normal temporal pulses     Eyes open, drowsy.  Slow to communicate.   Hypophonic speech.     Mild facial masking with infrequent blinking.  L>R leonard and CW rigidity.    No RLE tremor with rest, posture, kinesis, or intention.   Has intermittent myoclonus.   + grasp     Gait not evaluated     Non-contrast brain CT - 3/27/2013   Result Narrative       Comparison is made to the cranial CT exam of 3/11/09.    Ventricular system is unchanged in size from the prior exam, and the ventricles are normal in position without midline shift. Well defined, prominent sulci are again noted over both convexities. Parenchymal brain attenuation demonstrates no detrimental  change since the prior exam, with note again made of hemispheric white matter hypoattenuation bilaterally consistent with chronic ischemic microvascular disease. No evidence of acute intracranial hemorrhage. No findings on the noncontrast exam to   suggest neoplasm. No subdural collections. No new areas of parenchymal hypoattenuation or other abnormality indicating recent major vascular distribution infarction. Mucosal thickening in the posterior ethmoid air cells on the right side is   incidentally observed, and incidental note is again made of a small soft tissue opacity within the anterior aspect of the nasal fossa on the right side, an abnormality which appears essentially stable since the 2009 exam and may represent a nasal polyp.           Result Impression       Noncontrast cranial CT examination again demonstrates findings consistent with chronic ischemic microvascular changes in the hemispheric white matter bilaterally. However, there has been no significant detrimental " interval change since 3/11/09, with no  evidence of acute hemorrhage or recent major vascular distribution infarction noted.       - Independent review of images:   No acute intracranial process with diffuse atrophy consistent with age and moderate to severe white matter disease bilaterally suggestive of chronic microvascular ischemia.     Diagnoses:   1) Parkinsonism with dementia, likely mixed vascular and Lewy body progression at this point.  2) Excessive daytime sleepiness.  3) Dysautonomia.    Medical Decision Making:   - no new changes  - keep meds stable  - living arrangement stable  - exercise      Elliott Gonzalez MD, MPH  Division of Movement and Memory Disorders  [facility]

## 2017-09-06 NOTE — LETTER
September 11, 2017      Mello Jameson MD  [address] [place] [address]           [facility]  [address]  [place] [address]  Phone: [phone]  Fax: [phone]          Patient: Armando Eng Jr.   MR Number: [id number]   YOB: 1931   Date of Visit: 9/6/2017       Dear Dr. Mello Jameson:    Thank you for referring Armando Eng to me for evaluation. Attached you will find relevant portions of my assessment and plan of care.    If you have questions, please do not hesitate to call me. I look forward to following Armando Eng along with you.    Sincerely,    Albert Monroe  CC:  No Recipients    If you would like to receive this communication electronically, please contact [email or web address] or [phone] to request more information on [employer] access.    For providers and/or their staff who would like to refer a patient to [facility], please contact us through our one-stop-shop provider referral line, [facility] , at [phone].    If you feel you have received this communication in error or would no longer like to receive these types of communications, please e-mail [email or web address]

## 2017-12-05 ENCOUNTER — TELEPHONE (OUTPATIENT)
Dept: PSYCHIATRY | Facility: CLINIC | Age: 82
End: 2017-12-05

## 2017-12-05 NOTE — TELEPHONE ENCOUNTER
Spoke with Dr. Hammond.  Pt has been agitated and oppositional lately, refusing meds today.  A previous conversation with his wife revealed that she has been at home with the flu for a week and has not been visiting as she has done every day he has been in [facility].    Will try to move up appt.  It was also suggested that he be connected by phone to speak with his wife twice a day until she is well enough to visit again.

## 2017-12-05 NOTE — TELEPHONE ENCOUNTER
----- Message from Sruthi Torres MA sent at 12/5/2017 12:01 PM CST -----  Contact: pt  Pt wife would like you to call [phone]

## 2017-12-05 NOTE — TELEPHONE ENCOUNTER
----- Message from Neela Zepeda MA sent at 12/5/2017  1:56 PM CST -----  Contact: Dr. Jose Suarez [phone]  Dr. Suarez would like to speak with you regarding this pt .

## 2017-12-07 ENCOUNTER — OFFICE VISIT (OUTPATIENT)
Dept: PSYCHIATRY | Facility: CLINIC | Age: 82
End: 2017-12-07
Payer: MEDICARE

## 2017-12-07 VITALS — DIASTOLIC BLOOD PRESSURE: 58 MMHG | HEART RATE: 60 BPM | HEIGHT: 76 IN | SYSTOLIC BLOOD PRESSURE: 120 MMHG

## 2017-12-07 DIAGNOSIS — G20.A1 PD (PARKINSON'S DISEASE): ICD-10-CM

## 2017-12-07 DIAGNOSIS — F01.53 VASCULAR DEMENTIA WITH DEPRESSION: Primary | ICD-10-CM

## 2017-12-07 PROCEDURE — 90847 FAMILY PSYTX W/PT 50 MIN: CPT | Mod: PBBFAC | Performed by: PSYCHIATRY & NEUROLOGY

## 2017-12-07 PROCEDURE — 99999 PR PBB SHADOW E&M-EST. PATIENT-LVL III: CPT | Mod: PBBFAC,,, | Performed by: PSYCHIATRY & NEUROLOGY

## 2017-12-07 PROCEDURE — 99213 OFFICE O/P EST LOW 20 MIN: CPT | Mod: PBBFAC,25 | Performed by: PSYCHIATRY & NEUROLOGY

## 2017-12-07 PROCEDURE — 90847 FAMILY PSYTX W/PT 50 MIN: CPT | Mod: S$PBB,,, | Performed by: PSYCHIATRY & NEUROLOGY

## 2017-12-07 RX ORDER — ESCITALOPRAM OXALATE 20 MG/1
20 TABLET ORAL NIGHTLY
Qty: 90 TABLET | Refills: 1 | Status: SHIPPED | OUTPATIENT
Start: 2017-12-07

## 2017-12-07 RX ORDER — MEMANTINE HYDROCHLORIDE 28 MG/1
28 CAPSULE, EXTENDED RELEASE ORAL DAILY
Qty: 90 EACH | Refills: 1 | Status: SHIPPED | OUTPATIENT
Start: 2017-12-07

## 2017-12-07 NOTE — PATIENT INSTRUCTIONS
Move dose of Lexapro 20 mg from a.m. To bedtime.    Discontinue Exelon patch.    Arrange communication by phone twice a day between Pt and his wife while she is ill and unable to visit.    Continue other medications unchanged.

## 2017-12-07 NOTE — PROGRESS NOTES
Outpatient Psychiatry Follow-Up Visit (MD/NP)    12/7/2017    Clinical Status of Patient:  Outpatient (Ambulatory)    Chief Complaint:  Armando Eng Jr. is a 85 y.o. male who presents today for follow-up of Depression; Irritability; Memory Deficits; and Psychomotor Retardation    Met with patient and son.  Spoke with wife by phone.    Interval History and Content of Current Session:  Interim Events/Subjective Report/Content of Current Session:  Pt returned with his son on an urgent basis.  His wife has the flu, and has not been able to visit Pt for the past week.  He had become irritable, oppositional and was refusing medications.  It was suggested that a phone call be arranged twice a day with his wife until it is safe for her to visit again.    On exam today, Pt was awake, but subdued, with eyes closed for much of the visit and less interaction than in the past.  He and his son verified that he is eating, drinking fluids, and taking medicines again.  I spoke with Pt's wife by phone during the visit.  She has been making frequent calls and agreed to continue this until she is well.      Medications were reviewed.  Lexapro dosing was moved to night.  It was recommended that he be tried off of Exelon.  Other psychotropic meds were continued unchanged.    Psychotherapy:  · Target symptoms: Memory loss, confusion, agitation.  · Why chosen therapy is appropriate versus another modality: relevant to diagnosis  · Outcome monitoring methods: self-report, observation, feedback from family  · Therapeutic intervention type: behavior modifying psychotherapy, supportive psychotherapy, Family therapy  · Topics discussed/themes: Caregiver issues, relationships difficulties, illness/death of a loved one, stress related to medical comorbidities, symptom recognition, legal stressors, substance abuse  · The patient's response to the intervention is accepting. The patient's progress toward treatment goals is fair.   · Duration of  intervention: 30 min    Review of Systems   · PSYCHIATRIC: Pertinant items are noted in the narrative.  · CONSTITUTIONAL: No weight gain or loss.   · MUSCULOSKELETAL: Generalized weakness.  · NEUROLOGIC: Parkinson disease.  · CARDIOVASCULAR: HTN, Pacemaker.    Past Medical, Family and Social History: The patient's past medical, family and social history have been reviewed and updated as appropriate within the electronic medical record - see encounter notes.    Compliance: yes    Side effects: None    Risk Parameters:  Patient reports no suicidal ideation  Patient reports no homicidal ideation  Patient reports no self-injurious behavior  Patient reports no violent behavior    Exam (detailed: at least 9 elements; comprehensive: all 15 elements)   Constitutional  Vitals:  Most recent vital signs, dated less than 90 days prior to this appointment, were reviewed.    Vitals:    12/07/17 1527   BP: (!) 120/58   Pulse: 60        General:  age appropriate, casually dressed, seated in wheelchair     Musculoskeletal  Muscle Strength/Tone:  no rigidity, no dyskinesia, no tremor, atrophy noted generalized weakness.   Gait & Station:  in wheelchair     Psychiatric  Speech:  soft, non-spontaneous   Mood & Affect:  neutral  shallow   Thought Process:  concrete, poverty of thought   Associations:  intact   Thought Content:  normal, no suicidality, no homicidality, delusions, or paranoia   Insight:  poor awareness of illness   Judgement: impaired due to dementia   Orientation:  person, situation, month of year   Memory: poor x 3.   Language: grossly intact   Attention Span & Concentration:  distracted   Fund of Knowledge:  impaired     Assessment and Diagnosis   Status/Progress: Based on the examination today, the patient's problem(s) is/are worsening.  New problems have been presented today.   Comorbidities are complicating management of the primary condition.  There are no active rule-out diagnoses for this patient at this  time.    General Impression:  Pt seems to have recovered from agitation due to an absence of visits by his wife due to her having the flu.    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  Axis II: NO DIAGNOSIS ON AXIS II (V71.09)  Axis III: Parkinson dz.  Atrial Fibrillation.  Rash.  Axis IV: other psychosocial or environmental problems  Axis V: 61-70 Some mild symptoms (e.g. depressed mood and mild insomnia) OR some difficulty in social, occupational, or school functioning (e.g. occasional truancy, or theft within the household), but generally functioning pretty well, has some meaningful interpersonal relationships    Intervention/Counseling/Treatment Plan   · Medication Management: Change Lexapro 20 mg from a.m. to HS.  Stop Exelon.  Continue other meds unchanged.  · Pt should remain at [facility].      Return to Clinic: 3 months.  Call prn problems.

## 2017-12-08 ENCOUNTER — TELEPHONE (OUTPATIENT)
Dept: NEUROLOGY | Facility: CLINIC | Age: 82
End: 2017-12-08

## 2017-12-08 NOTE — TELEPHONE ENCOUNTER
----- Message from Yandy Nichols sent at 12/8/2017 10:24 AM CST -----  Contact: Meghan (wife) @ [phone]  pts wife says she left a message on yesterday concerning pts patch.  She says he is no longer using the patch.  She says she does not need a return call.